# Patient Record
(demographics unavailable — no encounter records)

---

## 2024-10-29 NOTE — REVIEW OF SYSTEMS
[Patient Intake Form Reviewed] : Patient intake form was reviewed [Negative] : Gastrointestinal [Fever] : no fever [Recent Change In Weight] : ~T no recent weight change [Incontinence] : no incontinence [Muscle Weakness] : no muscle weakness [Skin Wound] : no skin wound [Difficulty Walking] : no difficulty walking

## 2024-10-29 NOTE — PHYSICAL EXAM
[FreeTextEntry1] : General: Well-developed male in no apparent distress.  Patient is awake, alert and oriented x 3.  Cooperative. HEENT: Normocephalic, atraumatic.  MMM Lungs: Clear to auscultation. Cardiac: Regular rate and rhythm. Abdomen: Bowel sounds present, nondistended. Extremities: Left forefoot dressed, no pedal edema.  Right BKA: Cylindrical/conical shape and well-healed.  No skin adhesions, no skin breakdown, no tenderness to palpation.  Invagination noted of the bypass wound medially.  Full active range of motion of her knee.  Motor: Right upper extremity: Hyperostosis noted at the right elbow with limited range of motion.  Active elbow range of motion 45 degrees to 100 degrees.  Intrinsic atrophy noted with intrinsic minus posturing.  Thumb digit opposition impaired.  Otherwise 5/5 motor power. Left upper extremity: Tone normal, active range of motion within functional limits with 5/5 motor power.  Both lower extremities: Tone normal, active range of motion within functional limits with 5/5 motor power throughout.  Sensory: Absent pinprick at the distal left lower extremity. Muscle stretch reflexes: 0 KJ bilaterally.  Functional status: Sit to stand transfer independent.  Patient ambulated independently with a straight axis cane in the office with a right BK prosthesis with a silicone locking liner, 5 ply socks/posterior padding noted in the socket.  Lateral thrusting of the knee noted during ambulation.  Patient is a K3 ambulator.

## 2024-10-29 NOTE — ASSESSMENT
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  Patient requires that the prosthetic socket be replaced to improve prosthetic fit, comfort and for safety of ambulation.  Patient has progressed from a K2 to a K3 ambulator and his current prosthetic foot is too soft and requires a new prosthetic foot to enhance ambulation on uneven surfaces and comfort during ambulation.  With the appropriate prosthetic foot the patient will be able to negotiate more than 7 consecutive steps, ambulate over the rare terrain with a variable kay with increased ability and access pelvic transportation.  Will also be able to perform dual ambulation activities and ambulate through crabs thereby confirming him is a K3 ambulator.  In addition the patient's silicone liners are stretched and worn, prosthetic socks are frayed and require replacement as well.  Patient is a K3 ambulator.  Prescription provided for a right custom molded endoskeletal BK prosthesis with a total contact acrylic socket, flexible inner socket, test socket, silicone locking liner with locking mechanism, ultralight alignable components, K3 prosthetic foot, outer protective cover, 6 multiple ply socks, 6 single ply socks.  Prescription provided to initiate course of outpatient physical therapy 2-3 times per week, see Rx.  Patient currently receiving VNS services for status post left toe amputation.  I spent a total of 45 minutes on the date of the encounter evaluating and treating the patient including a discussion of treatment options.

## 2024-10-29 NOTE — HISTORY OF PRESENT ILLNESS
[FreeTextEntry1] : Patient is a 76-year-old right-hand-dominant male PMHx HTN, DM, DM neuropathy, CAD/CHF, CKD, hx right elbow ORIF/ulna neuropathy who presents today for prosthetic evaluation.  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  Patient's main complaint is that the prosthetic socket is significantly too large causing instability, no rotation reported.  Patient was getting significant pain at the distal tibia and fibular head.  Patient  was added padding and revised the socket with significant improvement of the pain.  No skin breakdown.  Patient is currently wearing 5 ply sock after the additional padding to the socket.  Patient's weight has been stable.  Patient reports 3 falls which occurred soon after he received his prosthesis, no recent falls.  Patient does get some episodic phantom pain which is well-controlled on gabapentin 300 mg 3 times daily.  Functionally the patient is an independent community ambulator with a straight axis cane.  Patient is independent in all transfers activities of daily living.  Patient can negotiate all environmental barriers such as curbs, ramps and stairs.  Patient is eager to return to driving, walking program around his neighborhood and plans on traveling to [place] to see his son.  In addition patient plans on going on a cruise next month.  Patient reports having left toe amputation in August/2024 and just completed home physical therapy.  Patient is receiving VNS wound care twice a week.  Patient lives with his wife in an elevated apartment, no steps to negotiate.